# Patient Record
Sex: MALE | Race: WHITE | NOT HISPANIC OR LATINO | Employment: FULL TIME | URBAN - METROPOLITAN AREA
[De-identification: names, ages, dates, MRNs, and addresses within clinical notes are randomized per-mention and may not be internally consistent; named-entity substitution may affect disease eponyms.]

---

## 2018-05-29 ENCOUNTER — OFFICE VISIT CONVERTED (OUTPATIENT)
Dept: SURGERY | Facility: CLINIC | Age: 23
End: 2018-05-29
Attending: PHYSICIAN ASSISTANT

## 2019-07-25 ENCOUNTER — HOSPITAL ENCOUNTER (OUTPATIENT)
Dept: URGENT CARE | Facility: CLINIC | Age: 24
Discharge: HOME | End: 2019-07-25
Attending: FAMILY MEDICINE

## 2019-10-21 ENCOUNTER — HOSPITAL ENCOUNTER (OUTPATIENT)
Dept: URGENT CARE | Facility: CLINIC | Age: 24
Discharge: HOME | End: 2019-10-21

## 2020-02-25 ENCOUNTER — HOSPITAL ENCOUNTER (OUTPATIENT)
Dept: URGENT CARE | Facility: CLINIC | Age: 25
Discharge: HOME | End: 2020-02-25
Attending: FAMILY MEDICINE

## 2020-02-27 LAB — BACTERIA SPEC AEROBE CULT: NORMAL

## 2020-03-01 ENCOUNTER — HOSPITAL ENCOUNTER (OUTPATIENT)
Dept: URGENT CARE | Facility: CLINIC | Age: 25
Discharge: HOME | End: 2020-03-01
Attending: FAMILY MEDICINE

## 2021-05-16 VITALS — WEIGHT: 191.37 LBS | RESPIRATION RATE: 12 BRPM | BODY MASS INDEX: 25.92 KG/M2 | HEIGHT: 72 IN

## 2021-07-15 ENCOUNTER — HOSPITAL ENCOUNTER (EMERGENCY)
Facility: HOSPITAL | Age: 26
Discharge: HOME | End: 2021-07-15
Attending: EMERGENCY MEDICINE | Admitting: EMERGENCY MEDICINE

## 2021-07-15 ENCOUNTER — APPOINTMENT (OUTPATIENT)
Dept: CT IMAGING | Facility: HOSPITAL | Age: 26
End: 2021-07-15

## 2021-07-15 ENCOUNTER — APPOINTMENT (OUTPATIENT)
Dept: GENERAL RADIOLOGY | Facility: HOSPITAL | Age: 26
End: 2021-07-15

## 2021-07-15 VITALS
RESPIRATION RATE: 18 BRPM | BODY MASS INDEX: 29.65 KG/M2 | SYSTOLIC BLOOD PRESSURE: 122 MMHG | TEMPERATURE: 98.9 F | OXYGEN SATURATION: 95 % | HEIGHT: 72 IN | HEART RATE: 73 BPM | DIASTOLIC BLOOD PRESSURE: 73 MMHG | WEIGHT: 218.92 LBS

## 2021-07-15 DIAGNOSIS — R55 VASOVAGAL SYNCOPE: Primary | ICD-10-CM

## 2021-07-15 LAB
ALBUMIN SERPL-MCNC: 4.6 G/DL (ref 3.5–5.2)
ALBUMIN/GLOB SERPL: 2 G/DL
ALP SERPL-CCNC: 92 U/L (ref 39–117)
ALT SERPL W P-5'-P-CCNC: 34 U/L (ref 1–41)
ANION GAP SERPL CALCULATED.3IONS-SCNC: 11.6 MMOL/L (ref 5–15)
AST SERPL-CCNC: 25 U/L (ref 1–40)
BASOPHILS # BLD AUTO: 0.08 10*3/MM3 (ref 0–0.2)
BASOPHILS NFR BLD AUTO: 0.9 % (ref 0–1.5)
BILIRUB SERPL-MCNC: 0.5 MG/DL (ref 0–1.2)
BUN SERPL-MCNC: 11 MG/DL (ref 6–20)
BUN/CREAT SERPL: 11.7 (ref 7–25)
CALCIUM SPEC-SCNC: 9.2 MG/DL (ref 8.6–10.5)
CHLORIDE SERPL-SCNC: 103 MMOL/L (ref 98–107)
CO2 SERPL-SCNC: 25.4 MMOL/L (ref 22–29)
CREAT SERPL-MCNC: 0.94 MG/DL (ref 0.76–1.27)
DEPRECATED RDW RBC AUTO: 41.4 FL (ref 37–54)
EOSINOPHIL # BLD AUTO: 0.12 10*3/MM3 (ref 0–0.4)
EOSINOPHIL NFR BLD AUTO: 1.4 % (ref 0.3–6.2)
ERYTHROCYTE [DISTWIDTH] IN BLOOD BY AUTOMATED COUNT: 12.6 % (ref 12.3–15.4)
GFR SERPL CREATININE-BSD FRML MDRD: 96 ML/MIN/1.73
GLOBULIN UR ELPH-MCNC: 2.3 GM/DL
GLUCOSE SERPL-MCNC: 108 MG/DL (ref 65–99)
HCT VFR BLD AUTO: 41 % (ref 37.5–51)
HGB BLD-MCNC: 14.1 G/DL (ref 13–17.7)
HOLD SPECIMEN: NORMAL
HOLD SPECIMEN: NORMAL
IMM GRANULOCYTES # BLD AUTO: 0.01 10*3/MM3 (ref 0–0.05)
IMM GRANULOCYTES NFR BLD AUTO: 0.1 % (ref 0–0.5)
LYMPHOCYTES # BLD AUTO: 2.92 10*3/MM3 (ref 0.7–3.1)
LYMPHOCYTES NFR BLD AUTO: 33.8 % (ref 19.6–45.3)
MCH RBC QN AUTO: 30.9 PG (ref 26.6–33)
MCHC RBC AUTO-ENTMCNC: 34.4 G/DL (ref 31.5–35.7)
MCV RBC AUTO: 89.9 FL (ref 79–97)
MONOCYTES # BLD AUTO: 0.54 10*3/MM3 (ref 0.1–0.9)
MONOCYTES NFR BLD AUTO: 6.3 % (ref 5–12)
NEUTROPHILS NFR BLD AUTO: 4.96 10*3/MM3 (ref 1.7–7)
NEUTROPHILS NFR BLD AUTO: 57.5 % (ref 42.7–76)
NRBC BLD AUTO-RTO: 0 /100 WBC (ref 0–0.2)
PLATELET # BLD AUTO: 292 10*3/MM3 (ref 140–450)
PMV BLD AUTO: 9.7 FL (ref 6–12)
POTASSIUM SERPL-SCNC: 3.7 MMOL/L (ref 3.5–5.2)
PROT SERPL-MCNC: 6.9 G/DL (ref 6–8.5)
QT INTERVAL: 382 MS
RBC # BLD AUTO: 4.56 10*6/MM3 (ref 4.14–5.8)
SODIUM SERPL-SCNC: 140 MMOL/L (ref 136–145)
WBC # BLD AUTO: 8.63 10*3/MM3 (ref 3.4–10.8)
WHOLE BLOOD HOLD SPECIMEN: NORMAL

## 2021-07-15 PROCEDURE — 70450 CT HEAD/BRAIN W/O DYE: CPT

## 2021-07-15 PROCEDURE — 85025 COMPLETE CBC W/AUTO DIFF WBC: CPT | Performed by: EMERGENCY MEDICINE

## 2021-07-15 PROCEDURE — 80053 COMPREHEN METABOLIC PANEL: CPT | Performed by: EMERGENCY MEDICINE

## 2021-07-15 PROCEDURE — 93010 ELECTROCARDIOGRAM REPORT: CPT | Performed by: INTERNAL MEDICINE

## 2021-07-15 PROCEDURE — 73070 X-RAY EXAM OF ELBOW: CPT

## 2021-07-15 PROCEDURE — 99284 EMERGENCY DEPT VISIT MOD MDM: CPT

## 2021-07-15 PROCEDURE — 93005 ELECTROCARDIOGRAM TRACING: CPT | Performed by: EMERGENCY MEDICINE

## 2021-07-15 RX ORDER — SODIUM CHLORIDE 0.9 % (FLUSH) 0.9 %
10 SYRINGE (ML) INJECTION AS NEEDED
Status: DISCONTINUED | OUTPATIENT
Start: 2021-07-15 | End: 2021-07-15 | Stop reason: HOSPADM

## 2021-07-15 RX ADMIN — SODIUM CHLORIDE 1000 ML: 9 INJECTION, SOLUTION INTRAVENOUS at 14:03

## 2021-07-15 NOTE — DISCHARGE INSTRUCTIONS
Drink plenty of fluids.  Eat small frequent meals.  Return to the emergency department if symptoms worsen or persist.  Follow-up with your primary care doctor in 1 to 2 days.

## 2021-07-15 NOTE — ED PROVIDER NOTES
"Subjective   Michael Acharya is a 26 y.o. male who presents to the emergency department today for evaluation s/p syncopal episode. Pt states he was at work when he received a call from his wife, who is 34 weeks pregnant, saying she might have to deliver today. Pt states with that news and all the stress of work he simply fainted \"due to all the emotion\" . Pt has no other complaints at this time. He denies headache, nausea, emesis, visual changes, confusion, numbness, back pain, neck pain, or any other pertinent sx or concerns.           Review of Systems   Constitutional: Negative for chills and fever.   HENT: Negative for nosebleeds.    Eyes: Negative for redness.   Respiratory: Negative for cough and shortness of breath.    Cardiovascular: Negative for chest pain.   Gastrointestinal: Negative for diarrhea and vomiting.   Genitourinary: Negative for dysuria and frequency.   Musculoskeletal: Negative for back pain and neck pain.   Skin: Negative for rash.   Neurological: Positive for syncope. Negative for seizures.       History reviewed. No pertinent past medical history.    No Known Allergies    History reviewed. No pertinent surgical history.    History reviewed. No pertinent family history.    Social History     Socioeconomic History   • Marital status:      Spouse name: Not on file   • Number of children: Not on file   • Years of education: Not on file   • Highest education level: Not on file   Tobacco Use   • Smoking status: Never Smoker   • Smokeless tobacco: Never Used   Vaping Use   • Vaping Use: Never used   Substance and Sexual Activity   • Alcohol use: Yes     Comment: occ   • Drug use: Never         Objective   Physical Exam  Vitals and nursing note reviewed.   Constitutional:       General: He is not in acute distress.  HENT:      Head: Normocephalic and atraumatic.      Nose: Nose normal.      Mouth/Throat:      Mouth: Mucous membranes are moist.   Eyes:      General: No scleral " icterus.  Cardiovascular:      Rate and Rhythm: Normal rate and regular rhythm.      Heart sounds: Normal heart sounds. No murmur heard.     Pulmonary:      Effort: No respiratory distress.      Breath sounds: Normal breath sounds.   Abdominal:      Palpations: Abdomen is soft.      Tenderness: There is no abdominal tenderness.      Hernia: No hernia is present.   Musculoskeletal:         General: No tenderness. Normal range of motion.      Cervical back: Normal range of motion and neck supple. No tenderness.      Right lower leg: No edema.      Left lower leg: No edema.      Comments: Mild right diffuse elbow tenderness    Skin:     General: Skin is warm and dry.   Neurological:      Mental Status: He is alert. Mental status is at baseline.      Sensory: No sensory deficit.      Motor: No weakness.   Psychiatric:         Behavior: Behavior normal.         Procedures         ED Course    EKG interpretation:  Normal sinus rhythm with a rate of 71  Normal P wave and normal IN interval  Normal QRS interval and normal axis  Normal ST segments  Normal QT and QTc         Labs Reviewed   COMPREHENSIVE METABOLIC PANEL - Abnormal; Notable for the following components:       Result Value    Glucose 108 (*)     All other components within normal limits    Narrative:     GFR Normal >60  Chronic Kidney Disease <60  Kidney Failure <15     CBC WITH AUTO DIFFERENTIAL - Normal   RAINBOW DRAW    Narrative:     The following orders were created for panel order [place] Draw.  Procedure                               Abnormality         Status                     ---------                               -----------         ------                     Green Top (Gel)[[id number]]                                  Final result               Lavender Top[[id number]]                                     Final result               Gold Top - SST[[id number]]                                   Final result                 Please view results for these  tests on the individual orders.   URINALYSIS W/ MICROSCOPIC IF INDICATED (NO CULTURE)   CBC AND DIFFERENTIAL    Narrative:     The following orders were created for panel order CBC & Differential.  Procedure                               Abnormality         Status                     ---------                               -----------         ------                     CBC Auto Differential[[id number]]        Normal              Final result                 Please view results for these tests on the individual orders.   GREEN TOP   LAVENDER TOP   GOLD TOP - SST                                            MDM  Number of Diagnoses or Management Options  Diagnosis management comments: I had a lengthy discussion with the patient about his symptoms and what happened he states that his wife is.  She delivered a baby and has been very anxious about that at work he states that he got back to work after her urgent visit to the OB/GYN had a syncopal episode.  Patient states he feels fine now other than some mild pain to the right elbow.  The patient CT and right elbow x-rays are both negative and the remainder of his work-up is negative.  I suspect that this was a vasovagal episode the patient be discharged home he is to drink plenty of fluids eat small frequent meals return for worsening symptoms or if they return otherwise he is to follow-up with his primary care doctor.       Amount and/or Complexity of Data Reviewed  Clinical lab tests: reviewed  Tests in the radiology section of CPT®: reviewed  Tests in the medicine section of CPT®: reviewed    Patient Progress  Patient progress: improved      Final diagnoses:   Vasovagal syncope       Documentation assistance provided by patricia Dooley.  Information recorded by the patricia was done at my direction and has been verified and validated by me.     Hope Dooley  07/15/21 0183       Brenton Hunter,   07/15/21 2723

## 2021-07-15 NOTE — ED NOTES
Per coworker patient was talking in the break room took a couple steps back then passed out.     Jodie Bonilla RN  07/15/21 8394

## 2021-07-15 NOTE — ED NOTES
Patient reports he has been stressed and he thinks that's why his anxiety went up.     Jodie Bonilla RN  07/15/21 6628

## 2022-01-11 PROCEDURE — U0004 COV-19 TEST NON-CDC HGH THRU: HCPCS | Performed by: NURSE PRACTITIONER

## 2022-01-12 ENCOUNTER — TELEPHONE (OUTPATIENT)
Dept: URGENT CARE | Facility: CLINIC | Age: 27
End: 2022-01-12

## 2022-01-12 NOTE — TELEPHONE ENCOUNTER
----- Message from CARLEY Hallman sent at 1/12/2022  9:34 AM EST -----  Please notify patient of negative COVID result.

## 2022-06-20 PROCEDURE — U0004 COV-19 TEST NON-CDC HGH THRU: HCPCS | Performed by: FAMILY MEDICINE

## 2022-06-22 ENCOUNTER — TELEPHONE (OUTPATIENT)
Dept: URGENT CARE | Facility: CLINIC | Age: 27
End: 2022-06-22

## 2022-06-22 NOTE — TELEPHONE ENCOUNTER
Spoke with patient regarding positive covid result.  Discussed quarantine guidelines and use of mychart for verification of result if needed for employer. No further questions at this time.

## 2023-10-25 ENCOUNTER — APPOINTMENT (OUTPATIENT)
Dept: GENERAL RADIOLOGY | Facility: HOSPITAL | Age: 28
End: 2023-10-25
Payer: MEDICAID

## 2023-10-25 ENCOUNTER — HOSPITAL ENCOUNTER (EMERGENCY)
Facility: HOSPITAL | Age: 28
Discharge: HOME | End: 2023-10-26
Attending: EMERGENCY MEDICINE
Payer: MEDICAID

## 2023-10-25 VITALS
WEIGHT: 211.2 LBS | OXYGEN SATURATION: 99 % | RESPIRATION RATE: 17 BRPM | HEIGHT: 72 IN | SYSTOLIC BLOOD PRESSURE: 129 MMHG | BODY MASS INDEX: 28.61 KG/M2 | TEMPERATURE: 98 F | DIASTOLIC BLOOD PRESSURE: 91 MMHG | HEART RATE: 75 BPM

## 2023-10-25 DIAGNOSIS — F41.9 ANXIETY: ICD-10-CM

## 2023-10-25 DIAGNOSIS — R07.9 CHEST PAIN, UNSPECIFIED TYPE: Primary | ICD-10-CM

## 2023-10-25 LAB
ALBUMIN SERPL-MCNC: 4.5 G/DL (ref 3.5–5.2)
ALBUMIN/GLOB SERPL: 1.7 G/DL
ALP SERPL-CCNC: 86 U/L (ref 39–117)
ALT SERPL W P-5'-P-CCNC: 19 U/L (ref 1–41)
ANION GAP SERPL CALCULATED.3IONS-SCNC: 11.9 MMOL/L (ref 5–15)
AST SERPL-CCNC: 17 U/L (ref 1–40)
BASOPHILS # BLD AUTO: 0.05 10*3/MM3 (ref 0–0.2)
BASOPHILS NFR BLD AUTO: 0.5 % (ref 0–1.5)
BILIRUB SERPL-MCNC: 0.4 MG/DL (ref 0–1.2)
BUN SERPL-MCNC: 16 MG/DL (ref 6–20)
BUN/CREAT SERPL: 17.8 (ref 7–25)
CALCIUM SPEC-SCNC: 9.1 MG/DL (ref 8.6–10.5)
CHLORIDE SERPL-SCNC: 103 MMOL/L (ref 98–107)
CO2 SERPL-SCNC: 24.1 MMOL/L (ref 22–29)
CREAT SERPL-MCNC: 0.9 MG/DL (ref 0.76–1.27)
DEPRECATED RDW RBC AUTO: 39.5 FL (ref 37–54)
EGFRCR SERPLBLD CKD-EPI 2021: 118.6 ML/MIN/1.73
EOSINOPHIL # BLD AUTO: 0.07 10*3/MM3 (ref 0–0.4)
EOSINOPHIL NFR BLD AUTO: 0.7 % (ref 0.3–6.2)
ERYTHROCYTE [DISTWIDTH] IN BLOOD BY AUTOMATED COUNT: 12 % (ref 12.3–15.4)
GLOBULIN UR ELPH-MCNC: 2.7 GM/DL
GLUCOSE SERPL-MCNC: 93 MG/DL (ref 65–99)
HCT VFR BLD AUTO: 41.8 % (ref 37.5–51)
HGB BLD-MCNC: 14 G/DL (ref 13–17.7)
HOLD SPECIMEN: NORMAL
HOLD SPECIMEN: NORMAL
IMM GRANULOCYTES # BLD AUTO: 0.02 10*3/MM3 (ref 0–0.05)
IMM GRANULOCYTES NFR BLD AUTO: 0.2 % (ref 0–0.5)
LIPASE SERPL-CCNC: 23 U/L (ref 13–60)
LYMPHOCYTES # BLD AUTO: 2.81 10*3/MM3 (ref 0.7–3.1)
LYMPHOCYTES NFR BLD AUTO: 29.7 % (ref 19.6–45.3)
MAGNESIUM SERPL-MCNC: 2.1 MG/DL (ref 1.6–2.6)
MCH RBC QN AUTO: 30.2 PG (ref 26.6–33)
MCHC RBC AUTO-ENTMCNC: 33.5 G/DL (ref 31.5–35.7)
MCV RBC AUTO: 90.1 FL (ref 79–97)
MONOCYTES # BLD AUTO: 0.62 10*3/MM3 (ref 0.1–0.9)
MONOCYTES NFR BLD AUTO: 6.6 % (ref 5–12)
NEUTROPHILS NFR BLD AUTO: 5.88 10*3/MM3 (ref 1.7–7)
NEUTROPHILS NFR BLD AUTO: 62.3 % (ref 42.7–76)
NRBC BLD AUTO-RTO: 0 /100 WBC (ref 0–0.2)
NT-PROBNP SERPL-MCNC: <36 PG/ML (ref 0–450)
PLATELET # BLD AUTO: 323 10*3/MM3 (ref 140–450)
PMV BLD AUTO: 10 FL (ref 6–12)
POTASSIUM SERPL-SCNC: 3.8 MMOL/L (ref 3.5–5.2)
PROT SERPL-MCNC: 7.2 G/DL (ref 6–8.5)
RBC # BLD AUTO: 4.64 10*6/MM3 (ref 4.14–5.8)
SODIUM SERPL-SCNC: 139 MMOL/L (ref 136–145)
TROPONIN T SERPL HS-MCNC: <6 NG/L
WBC NRBC COR # BLD: 9.45 10*3/MM3 (ref 3.4–10.8)
WHOLE BLOOD HOLD COAG: NORMAL
WHOLE BLOOD HOLD SPECIMEN: NORMAL

## 2023-10-25 PROCEDURE — 84484 ASSAY OF TROPONIN QUANT: CPT | Performed by: EMERGENCY MEDICINE

## 2023-10-25 PROCEDURE — 83880 ASSAY OF NATRIURETIC PEPTIDE: CPT | Performed by: EMERGENCY MEDICINE

## 2023-10-25 PROCEDURE — 71045 X-RAY EXAM CHEST 1 VIEW: CPT

## 2023-10-25 PROCEDURE — 85025 COMPLETE CBC W/AUTO DIFF WBC: CPT

## 2023-10-25 PROCEDURE — 99284 EMERGENCY DEPT VISIT MOD MDM: CPT

## 2023-10-25 PROCEDURE — 83690 ASSAY OF LIPASE: CPT | Performed by: EMERGENCY MEDICINE

## 2023-10-25 PROCEDURE — 80053 COMPREHEN METABOLIC PANEL: CPT | Performed by: EMERGENCY MEDICINE

## 2023-10-25 PROCEDURE — 93010 ELECTROCARDIOGRAM REPORT: CPT | Performed by: INTERNAL MEDICINE

## 2023-10-25 PROCEDURE — 36415 COLL VENOUS BLD VENIPUNCTURE: CPT

## 2023-10-25 PROCEDURE — 93005 ELECTROCARDIOGRAM TRACING: CPT

## 2023-10-25 PROCEDURE — 83735 ASSAY OF MAGNESIUM: CPT | Performed by: EMERGENCY MEDICINE

## 2023-10-25 PROCEDURE — 93005 ELECTROCARDIOGRAM TRACING: CPT | Performed by: EMERGENCY MEDICINE

## 2023-10-25 RX ORDER — ASPIRIN 81 MG/1
324 TABLET, CHEWABLE ORAL ONCE
Status: DISCONTINUED | OUTPATIENT
Start: 2023-10-25 | End: 2023-10-26 | Stop reason: HOSPADM

## 2023-10-25 RX ORDER — SODIUM CHLORIDE 0.9 % (FLUSH) 0.9 %
10 SYRINGE (ML) INJECTION AS NEEDED
Status: DISCONTINUED | OUTPATIENT
Start: 2023-10-25 | End: 2023-10-26 | Stop reason: HOSPADM

## 2023-10-25 RX ORDER — LORAZEPAM 0.5 MG/1
1 TABLET ORAL ONCE
Status: COMPLETED | OUTPATIENT
Start: 2023-10-25 | End: 2023-10-25

## 2023-10-25 RX ADMIN — LORAZEPAM 1 MG: 0.5 TABLET ORAL at 23:29

## 2023-10-26 LAB
QT INTERVAL: 368 MS
QTC INTERVAL: 381 MS

## 2023-10-26 NOTE — ED PROVIDER NOTES
Time: 9:53 PM EDT  Date of encounter:  10/25/2023  Independent Historian/Clinical History and Information was obtained by:   Patient    History is limited by: N/A    Chief Complaint   Patient presents with    Chest Pain         History of Present Illness:  Patient is a 28 y.o. year old male who presents to the emergency department for evaluation of patient complains of chest pain that started 1 hour ago while he was at work.  Also had a near syncopal episode at the start of chest pain. (CARLEY Gaines Provider in Triage)    Some left-sided chest pain and anxiety that developed prior to arrival while at work.  He has no radiation of his pain.  No associated nausea vomiting or shortness of breath.  He does state that he has suffered with increasing anxiety and some depression.  He has had constant thoughts of suicide but no specific plan.  He states he has been dealing with this for some time and follows with a therapist as an outpatient.      Patient Care Team  Primary Care Provider: Provider, No Known    Past Medical History:     No Known Allergies  Past Medical History:   Diagnosis Date    Anxiety      History reviewed. No pertinent surgical history.  History reviewed. No pertinent family history.    Home Medications:  Prior to Admission medications    Medication Sig Start Date End Date Taking? Authorizing Provider   cetirizine (zyrTEC) 10 MG tablet Take 1 tablet by mouth Daily for 30 days. 7/1/23 7/31/23  Leslee Rogers APRN   fluticasone (FLONASE) 50 MCG/ACT nasal spray 2 sprays into the nostril(s) as directed by provider Daily. 7/1/23   Leslee Rogers APRN   trimethoprim-polymyxin b (Polytrim) 18521-9.1 UNIT/ML-% ophthalmic solution Administer 1 drop into the left eye Every 4 (Four) Hours for 7 days. 10/17/23 10/24/23  Yuri Mcnamara PA-C        Social History:   Social History     Tobacco Use    Smoking status: Never    Smokeless tobacco: Never   Vaping Use    Vaping Use: Never used  "  Substance Use Topics    Alcohol use: Yes     Comment: occ    Drug use: Never         Review of Systems:  Review of Systems   Constitutional:  Negative for chills and fever.   HENT:  Negative for congestion, ear pain and sore throat.    Eyes:  Negative for pain.   Respiratory:  Negative for cough, chest tightness and shortness of breath.    Cardiovascular:  Positive for chest pain.   Gastrointestinal:  Negative for abdominal pain, diarrhea, nausea and vomiting.   Genitourinary:  Negative for flank pain and hematuria.   Musculoskeletal:  Negative for joint swelling.   Skin:  Negative for pallor.   Neurological:  Negative for seizures and headaches.   Psychiatric/Behavioral:  Positive for dysphoric mood. The patient is nervous/anxious.    All other systems reviewed and are negative.       Physical Exam:  /91 (BP Location: Left arm, Patient Position: Sitting)   Pulse 75   Temp 98 °F (36.7 °C) (Oral)   Resp 17   Ht 182.9 cm (72\")   Wt 95.8 kg (211 lb 3.2 oz)   SpO2 99%   BMI 28.64 kg/m²         Physical Exam  Vitals and nursing note reviewed.   Constitutional:       General: He is not in acute distress.     Appearance: Normal appearance. He is not toxic-appearing.   HENT:      Head: Normocephalic and atraumatic.      Jaw: There is normal jaw occlusion.   Eyes:      General: Lids are normal.      Extraocular Movements: Extraocular movements intact.      Conjunctiva/sclera: Conjunctivae normal.      Pupils: Pupils are equal, round, and reactive to light.   Cardiovascular:      Rate and Rhythm: Normal rate and regular rhythm.      Pulses: Normal pulses.      Heart sounds: Normal heart sounds.   Pulmonary:      Effort: Pulmonary effort is normal. No respiratory distress.      Breath sounds: Normal breath sounds. No wheezing or rhonchi.   Abdominal:      General: Abdomen is flat. There is no distension.      Palpations: Abdomen is soft.      Tenderness: There is no abdominal tenderness. There is no guarding or " rebound.   Musculoskeletal:         General: Normal range of motion.      Cervical back: Normal range of motion and neck supple.      Right lower leg: No edema.      Left lower leg: No edema.   Skin:     General: Skin is warm and dry.      Coloration: Skin is not cyanotic.   Neurological:      Mental Status: He is alert and oriented to person, place, and time. Mental status is at baseline.   Psychiatric:         Attention and Perception: Attention and perception normal.         Mood and Affect: Mood normal.                  Procedures:  Procedures      Medical Decision Making:      Comorbidities that affect care:    Anxiety    External Notes reviewed:    Previous Clinic Note: Outpatient urgent care visit October 17, 2023      The following orders were placed and all results were independently analyzed by me:  Orders Placed This Encounter   Procedures    XR Chest 1 View    [place] Draw    High Sensitivity Troponin T    Comprehensive Metabolic Panel    Lipase    BNP    Magnesium    CBC Auto Differential    Ambulatory Referral to Psychiatry    Undress & Gown    Continuous Pulse Oximetry    CBC & Differential    Green Top (Gel)    Lavender Top    Gold Top - SST    Light Blue Top       Medications Given in the Emergency Department:  Medications   LORazepam (ATIVAN) tablet 1 mg (1 mg Oral Given 10/25/23 0058)        ED Course:    The patient was initially evaluated in the triage area where orders were placed. The patient was later dispositioned by Christo Portillo MD.      The patient was advised to stay for completion of workup which includes but is not limited to communication of labs and radiological results, reassessment and plan. The patient was advised that leaving prior to disposition by a provider could result in critical findings that are not communicated to the patient.     ED Course as of 10/26/23 0727   Wed Oct 25, 2023   7134   --- PROVIDER IN TRIAGE NOTE ---    The patient was evaluated by Jamila montero in  triage. Orders were placed and the patient is currently awaiting disposition.      [CE]   2300 My interpretation of EKG: Sinus rhythm 64, no acute ischemia [JS]   u Oct 26, 2023   0006 HEART SCORE  History: Slightly Suspicious (0)  ECG: Normal (0)  Age: Less than 45 years (0)  Risk factors: No Risk Factors (0)  Troponin: normal (0)    This patient's HEART score is 0.    According to the HEART Score Study: Score (Risk of adverse cardiac event in the next 14 days)  Scores 0-3: (0.9-1.7%) In the HEART Score study, these patients were discharged home.  Scores 4-6: (12-16.6%)  In the HEART Score study, these patients were admitted to the hospital.  Scores =7: (50-65%) In the HEART Score study, these patients were candidates for early invasive measures.   Final disposition is based on individual provider judgement and current clinical situation.     [JS]   0006 Patient does endorse some suicidal thoughts that he states have been ongoing and chronic in nature.  He has no specific plan.  He is currently following with a therapist who he sees every other week.  He is not currently on any medications for his anxiety and depression.  He is interested in following up with a psychiatrist.  I have provided him with an ambulatory referral.  He states he is not a danger to himself.  He agrees to promptly return to the emergency department with any worsening of his symptoms.  We discussed return precautions including worsening symptoms or any additional concerns. [JS]      ED Course User Index  [CE] Jamila Chiu, APRN  [JS] Christo Portillo MD       Labs:    Lab Results (last 24 hours)       Procedure Component Value Units Date/Time    High Sensitivity Troponin T [[id number]]  (Normal) Collected: 10/25/23 2205    Specimen: Blood Updated: 10/25/23 2307     HS Troponin T <6 ng/L     Narrative:      High Sensitive Troponin T Reference Range:  <10.0 ng/L- Negative Female for AMI  <15.0 ng/L- Negative Male for AMI  >=10 - Abnormal  Female indicating possible myocardial injury.  >=15 - Abnormal Male indicating possible myocardial injury.   Clinicians would have to utilize clinical acumen, EKG, Troponin, and serial changes to determine if it is an Acute Myocardial Infarction or myocardial injury due to an underlying chronic condition.         CBC & Differential [[id number]]  (Abnormal) Collected: 10/25/23 2205    Specimen: Blood Updated: 10/25/23 2235    Narrative:      The following orders were created for panel order CBC & Differential.  Procedure                               Abnormality         Status                     ---------                               -----------         ------                     CBC Auto Differential[[id number]]        Abnormal            Final result                 Please view results for these tests on the individual orders.    Comprehensive Metabolic Panel [[id number]] Collected: 10/25/23 2205    Specimen: Blood Updated: 10/25/23 2309     Glucose 93 mg/dL      BUN 16 mg/dL      Creatinine 0.90 mg/dL      Sodium 139 mmol/L      Potassium 3.8 mmol/L      Chloride 103 mmol/L      CO2 24.1 mmol/L      Calcium 9.1 mg/dL      Total Protein 7.2 g/dL      Albumin 4.5 g/dL      ALT (SGPT) 19 U/L      AST (SGOT) 17 U/L      Alkaline Phosphatase 86 U/L      Total Bilirubin 0.4 mg/dL      Globulin 2.7 gm/dL      A/G Ratio 1.7 g/dL      BUN/Creatinine Ratio 17.8     Anion Gap 11.9 mmol/L      eGFR 118.6 mL/min/1.73     Narrative:      GFR Normal >60  Chronic Kidney Disease <60  Kidney Failure <15      Lipase [[id number]]  (Normal) Collected: 10/25/23 2205    Specimen: Blood Updated: 10/25/23 2309     Lipase 23 U/L     BNP [[id number]]  (Normal) Collected: 10/25/23 2205    Specimen: Blood Updated: 10/25/23 2303     proBNP <36.0 pg/mL     Narrative:      This assay is used as an aid in the diagnosis of individuals suspected of having heart failure. It can be used as an aid in the diagnosis of acute decompensated heart failure  (ADHF) in patients presenting with signs and symptoms of ADHF to the emergency department (ED). In addition, NT-proBNP of <300 pg/mL indicates ADHF is not likely.    Age Range Result Interpretation  NT-proBNP Concentration (pg/mL:      <50             Positive            >450                   Gray                 300-450                    Negative             <300    50-75           Positive            >900                  Gray                300-900                  Negative            <300      >75             Positive            >1800                  Gray                300-1800                  Negative            <300    Magnesium [[id number]]  (Normal) Collected: 10/25/23 2205    Specimen: Blood Updated: 10/25/23 2309     Magnesium 2.1 mg/dL     CBC Auto Differential [[id number]]  (Abnormal) Collected: 10/25/23 2205    Specimen: Blood Updated: 10/25/23 2235     WBC 9.45 10*3/mm3      RBC 4.64 10*6/mm3      Hemoglobin 14.0 g/dL      Hematocrit 41.8 %      MCV 90.1 fL      MCH 30.2 pg      MCHC 33.5 g/dL      RDW 12.0 %      RDW-SD 39.5 fl      MPV 10.0 fL      Platelets 323 10*3/mm3      Neutrophil % 62.3 %      Lymphocyte % 29.7 %      Monocyte % 6.6 %      Eosinophil % 0.7 %      Basophil % 0.5 %      Immature Grans % 0.2 %      Neutrophils, Absolute 5.88 10*3/mm3      Lymphocytes, Absolute 2.81 10*3/mm3      Monocytes, Absolute 0.62 10*3/mm3      Eosinophils, Absolute 0.07 10*3/mm3      Basophils, Absolute 0.05 10*3/mm3      Immature Grans, Absolute 0.02 10*3/mm3      nRBC 0.0 /100 WBC              Imaging:    XR Chest 1 View    Result Date: 10/26/2023  PROCEDURE: XR CHEST 1 VW  COMPARISON: 10/24/2015.  INDICATIONS: Chest pain.  FINDINGS:  A single AP (or PA) upright portable chest radiograph was performed.  No cardiac enlargement is seen.  No acute infiltrate is appreciated.  No pleural effusion or pneumothorax is identified.  No significant interval change is seen since the prior study (or studies).         No acute infiltrate is appreciated.     Please note that portions of this note were completed with a voice recognition program.  HERMINIO CEBALLOS JR, MD       Electronically Signed and Approved By: HERMINIO CEBALLOS JR, MD on 10/26/2023 at 0:36                 Differential Diagnosis and Discussion:      Chest Pain:  Based on the patient's signs and symptoms, I considered aortic dissection, myocardial infaction, pulmonary embolism, cardiac tamponade, pericarditis, pneumothorax, musculoskeletal chest pain and other differential diagnosis as an etiology of the patient's chest pain.     All labs were reviewed and interpreted by me.  All X-rays impressions were independently interpreted by me.  EKG was interpreted by me.    MDM           Patient Care Considerations:    PERC: I used the PERC score to risk stratify the patient for PE and a CT of the chest was considered but ultimately not indicated in today's visit.      Consultants/Shared Management Plan:    None    Social Determinants of Health:    Patient is independent, reliable, and has access to care.       Disposition and Care Coordination:    Discharged: The patient is suitable and stable for discharge with no need for consideration of observation or admission.    I have explained the patient´s condition, diagnoses and treatment plan based on the information available to me at this time. I have answered questions and addressed any concerns. The patient has a good  understanding of the patient´s diagnosis, condition, and treatment plan as can be expected at this point. The vital signs have been stable. The patient´s condition is stable and appropriate for discharge from the emergency department.      The patient will pursue further outpatient evaluation with the primary care physician or other designated or consulting physician as outlined in the discharge instructions. They are agreeable to this plan of care and follow-up instructions have been explained in  detail. The patient has received these instructions in written format and have expressed an understanding of the discharge instructions. The patient is aware that any significant change in condition or worsening of symptoms should prompt an immediate return to this or the closest emergency department or call to 911.  I have explained discharge medications and the need for follow up with the patient/caretakers. This was also printed in the discharge instructions. Patient was discharged with the following medications and follow up:      Medication List        Stop      trimethoprim-polymyxin b 44030-7.1 UNIT/ML-% ophthalmic solution  Commonly known as: Polytrim           [facility]  [address] [facility] [address]  [phone]  Schedule an appointment as soon as possible for a visit          Final diagnoses:   Chest pain, unspecified type   Anxiety        ED Disposition       ED Disposition   Discharge    Condition   Stable    Comment   --               This medical record created using voice recognition software.             Christo Portillo MD  10/26/23 0747

## 2023-10-31 PROBLEM — J02.0 STREP PHARYNGITIS: Status: ACTIVE | Noted: 2023-10-31

## 2024-08-22 ENCOUNTER — HOSPITAL ENCOUNTER (EMERGENCY)
Facility: HOSPITAL | Age: 29
Discharge: HOME | End: 2024-08-22
Attending: EMERGENCY MEDICINE
Payer: COMMERCIAL

## 2024-08-22 ENCOUNTER — APPOINTMENT (OUTPATIENT)
Dept: GENERAL RADIOLOGY | Facility: HOSPITAL | Age: 29
End: 2024-08-22
Payer: COMMERCIAL

## 2024-08-22 ENCOUNTER — APPOINTMENT (OUTPATIENT)
Dept: CT IMAGING | Facility: HOSPITAL | Age: 29
End: 2024-08-22
Payer: COMMERCIAL

## 2024-08-22 VITALS
OXYGEN SATURATION: 96 % | SYSTOLIC BLOOD PRESSURE: 113 MMHG | WEIGHT: 216 LBS | BODY MASS INDEX: 29.26 KG/M2 | DIASTOLIC BLOOD PRESSURE: 79 MMHG | TEMPERATURE: 97.9 F | HEART RATE: 72 BPM | HEIGHT: 72 IN | RESPIRATION RATE: 13 BRPM

## 2024-08-22 DIAGNOSIS — R09.1 PLEURISY: Primary | ICD-10-CM

## 2024-08-22 LAB
ALBUMIN SERPL-MCNC: 4.5 G/DL (ref 3.5–5.2)
ALBUMIN/GLOB SERPL: 1.5 G/DL
ALP SERPL-CCNC: 92 U/L (ref 39–117)
ALT SERPL W P-5'-P-CCNC: 22 U/L (ref 1–41)
ANION GAP SERPL CALCULATED.3IONS-SCNC: 9.5 MMOL/L (ref 5–15)
AST SERPL-CCNC: 21 U/L (ref 1–40)
BASOPHILS # BLD AUTO: 0.06 10*3/MM3 (ref 0–0.2)
BASOPHILS NFR BLD AUTO: 0.6 % (ref 0–1.5)
BILIRUB SERPL-MCNC: 0.6 MG/DL (ref 0–1.2)
BUN SERPL-MCNC: 9 MG/DL (ref 6–20)
BUN/CREAT SERPL: 8.2 (ref 7–25)
CALCIUM SPEC-SCNC: 9.6 MG/DL (ref 8.6–10.5)
CHLORIDE SERPL-SCNC: 101 MMOL/L (ref 98–107)
CO2 SERPL-SCNC: 27.5 MMOL/L (ref 22–29)
CREAT SERPL-MCNC: 1.1 MG/DL (ref 0.76–1.27)
DEPRECATED RDW RBC AUTO: 40.3 FL (ref 37–54)
EGFRCR SERPLBLD CKD-EPI 2021: 92.6 ML/MIN/1.73
EOSINOPHIL # BLD AUTO: 0.07 10*3/MM3 (ref 0–0.4)
EOSINOPHIL NFR BLD AUTO: 0.7 % (ref 0.3–6.2)
ERYTHROCYTE [DISTWIDTH] IN BLOOD BY AUTOMATED COUNT: 12.3 % (ref 12.3–15.4)
GEN 5 2HR TROPONIN T REFLEX: <6 NG/L
GLOBULIN UR ELPH-MCNC: 3 GM/DL
GLUCOSE SERPL-MCNC: 120 MG/DL (ref 65–99)
HCT VFR BLD AUTO: 43.1 % (ref 37.5–51)
HGB BLD-MCNC: 14.6 G/DL (ref 13–17.7)
HOLD SPECIMEN: NORMAL
HOLD SPECIMEN: NORMAL
IMM GRANULOCYTES # BLD AUTO: 0.03 10*3/MM3 (ref 0–0.05)
IMM GRANULOCYTES NFR BLD AUTO: 0.3 % (ref 0–0.5)
LIPASE SERPL-CCNC: 18 U/L (ref 13–60)
LYMPHOCYTES # BLD AUTO: 1.2 10*3/MM3 (ref 0.7–3.1)
LYMPHOCYTES NFR BLD AUTO: 12.3 % (ref 19.6–45.3)
MAGNESIUM SERPL-MCNC: 1.9 MG/DL (ref 1.6–2.6)
MCH RBC QN AUTO: 30.4 PG (ref 26.6–33)
MCHC RBC AUTO-ENTMCNC: 33.9 G/DL (ref 31.5–35.7)
MCV RBC AUTO: 89.8 FL (ref 79–97)
MONOCYTES # BLD AUTO: 0.65 10*3/MM3 (ref 0.1–0.9)
MONOCYTES NFR BLD AUTO: 6.7 % (ref 5–12)
NEUTROPHILS NFR BLD AUTO: 7.72 10*3/MM3 (ref 1.7–7)
NEUTROPHILS NFR BLD AUTO: 79.4 % (ref 42.7–76)
NRBC BLD AUTO-RTO: 0 /100 WBC (ref 0–0.2)
NT-PROBNP SERPL-MCNC: <36 PG/ML (ref 0–450)
PLATELET # BLD AUTO: 305 10*3/MM3 (ref 140–450)
PMV BLD AUTO: 9.5 FL (ref 6–12)
POTASSIUM SERPL-SCNC: 4.1 MMOL/L (ref 3.5–5.2)
PROT SERPL-MCNC: 7.5 G/DL (ref 6–8.5)
QT INTERVAL: 346 MS
QT INTERVAL: 373 MS
QTC INTERVAL: 401 MS
QTC INTERVAL: 411 MS
RBC # BLD AUTO: 4.8 10*6/MM3 (ref 4.14–5.8)
SODIUM SERPL-SCNC: 138 MMOL/L (ref 136–145)
TROPONIN T DELTA: NORMAL
TROPONIN T SERPL HS-MCNC: <6 NG/L
WBC NRBC COR # BLD AUTO: 9.73 10*3/MM3 (ref 3.4–10.8)
WHOLE BLOOD HOLD COAG: NORMAL
WHOLE BLOOD HOLD SPECIMEN: NORMAL

## 2024-08-22 PROCEDURE — 93005 ELECTROCARDIOGRAM TRACING: CPT

## 2024-08-22 PROCEDURE — 93010 ELECTROCARDIOGRAM REPORT: CPT | Performed by: INTERNAL MEDICINE

## 2024-08-22 PROCEDURE — 85025 COMPLETE CBC W/AUTO DIFF WBC: CPT | Performed by: EMERGENCY MEDICINE

## 2024-08-22 PROCEDURE — 71045 X-RAY EXAM CHEST 1 VIEW: CPT

## 2024-08-22 PROCEDURE — 93005 ELECTROCARDIOGRAM TRACING: CPT | Performed by: EMERGENCY MEDICINE

## 2024-08-22 PROCEDURE — 96375 TX/PRO/DX INJ NEW DRUG ADDON: CPT

## 2024-08-22 PROCEDURE — 83880 ASSAY OF NATRIURETIC PEPTIDE: CPT | Performed by: EMERGENCY MEDICINE

## 2024-08-22 PROCEDURE — 25510000001 IOPAMIDOL PER 1 ML: Performed by: EMERGENCY MEDICINE

## 2024-08-22 PROCEDURE — 25010000002 METHYLPREDNISOLONE PER 125 MG: Performed by: EMERGENCY MEDICINE

## 2024-08-22 PROCEDURE — 71260 CT THORAX DX C+: CPT

## 2024-08-22 PROCEDURE — 84484 ASSAY OF TROPONIN QUANT: CPT | Performed by: EMERGENCY MEDICINE

## 2024-08-22 PROCEDURE — 83690 ASSAY OF LIPASE: CPT | Performed by: EMERGENCY MEDICINE

## 2024-08-22 PROCEDURE — 36415 COLL VENOUS BLD VENIPUNCTURE: CPT

## 2024-08-22 PROCEDURE — 80053 COMPREHEN METABOLIC PANEL: CPT | Performed by: EMERGENCY MEDICINE

## 2024-08-22 PROCEDURE — 99285 EMERGENCY DEPT VISIT HI MDM: CPT

## 2024-08-22 PROCEDURE — 96374 THER/PROPH/DIAG INJ IV PUSH: CPT

## 2024-08-22 PROCEDURE — 83735 ASSAY OF MAGNESIUM: CPT | Performed by: EMERGENCY MEDICINE

## 2024-08-22 PROCEDURE — 25010000002 KETOROLAC TROMETHAMINE PER 15 MG: Performed by: EMERGENCY MEDICINE

## 2024-08-22 RX ORDER — FAMOTIDINE 20 MG/1
20 TABLET, FILM COATED ORAL 2 TIMES DAILY
Qty: 30 TABLET | Refills: 0 | Status: SHIPPED | OUTPATIENT
Start: 2024-08-22

## 2024-08-22 RX ORDER — METHYLPREDNISOLONE 4 MG
TABLET, DOSE PACK ORAL
Qty: 21 TABLET | Refills: 0 | Status: SHIPPED | OUTPATIENT
Start: 2024-08-22 | End: 2024-08-27

## 2024-08-22 RX ORDER — IOPAMIDOL 755 MG/ML
100 INJECTION, SOLUTION INTRAVASCULAR
Status: COMPLETED | OUTPATIENT
Start: 2024-08-22 | End: 2024-08-22

## 2024-08-22 RX ORDER — ASPIRIN 81 MG/1
324 TABLET, CHEWABLE ORAL ONCE
Status: COMPLETED | OUTPATIENT
Start: 2024-08-22 | End: 2024-08-22

## 2024-08-22 RX ORDER — SODIUM CHLORIDE 0.9 % (FLUSH) 0.9 %
10 SYRINGE (ML) INJECTION AS NEEDED
Status: DISCONTINUED | OUTPATIENT
Start: 2024-08-22 | End: 2024-08-23 | Stop reason: HOSPADM

## 2024-08-22 RX ORDER — KETOROLAC TROMETHAMINE 30 MG/ML
30 INJECTION, SOLUTION INTRAMUSCULAR; INTRAVENOUS ONCE
Status: COMPLETED | OUTPATIENT
Start: 2024-08-22 | End: 2024-08-22

## 2024-08-22 RX ORDER — IBUPROFEN 800 MG/1
800 TABLET, FILM COATED ORAL EVERY 8 HOURS PRN
Qty: 30 TABLET | Refills: 0 | Status: SHIPPED | OUTPATIENT
Start: 2024-08-22

## 2024-08-22 RX ADMIN — KETOROLAC TROMETHAMINE 30 MG: 30 INJECTION, SOLUTION INTRAMUSCULAR; INTRAVENOUS at 20:17

## 2024-08-22 RX ADMIN — IOPAMIDOL 100 ML: 755 INJECTION, SOLUTION INTRAVENOUS at 21:31

## 2024-08-22 RX ADMIN — ASPIRIN 324 MG: 81 TABLET, CHEWABLE ORAL at 18:29

## 2024-08-22 RX ADMIN — METHYLPREDNISOLONE SODIUM SUCCINATE 125 MG: 125 INJECTION INTRAMUSCULAR; INTRAVENOUS at 20:18

## 2024-08-22 NOTE — ED NOTES
Pt has some concerns with having chest pain as he works with hazardous material daily and is worried it could have something to do with exposure

## 2024-08-22 NOTE — Clinical Note
[facility] EMERGENCY ROOM  [address] [place] [address]  Phone: [phone]  Fax: [phone]    Michael Acharya was seen and treated in our emergency department on 8/22/2024.  He may return to work on 08/24/2024.         Thank you for choosing [facility].    Abiodun Jarquin MD

## 2024-08-23 NOTE — ED PROVIDER NOTES
Time: 9:55 PM EDT  Date of encounter:  8/22/2024  Independent Historian/Clinical History and Information was obtained by:   Patient    History is limited by: N/A    Chief Complaint: Chest pain      History of Present Illness:  Patient is a 29 y.o. year old male who presents to the emergency department for evaluation of chest pain with inspiration.  Patient reports no pain unless he takes a deep breath.  He does report the pain is improved after taking aspirin.  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    HPI    Patient Care Team  Primary Care Provider: Provider, No Known    Past Medical History:     No Known Allergies  Past Medical History:   Diagnosis Date    Anxiety      History reviewed. No pertinent surgical history.  History reviewed. No pertinent family history.    Home Medications:  Prior to Admission medications    Medication Sig Start Date End Date Taking? Authorizing Provider   azithromycin (Zithromax Z-Manish) 250 MG tablet Take 2 tablets by mouth on day 1, then 1 tablet daily on days 2-5 5/20/24   Roque Faye DO   benzonatate (TESSALON) 100 MG capsule Take 2 capsules by mouth 3 (Three) Times a Day As Needed for Cough for up to 30 doses. 2/23/24   Yuri Mcnamara PA-C   famotidine (PEPCID) 20 MG tablet Take 1 tablet by mouth 2 (Two) Times a Day. 8/22/24   Abiodun Jarquin MD   ibuprofen (ADVIL,MOTRIN) 800 MG tablet Take 1 tablet by mouth Every 8 (Eight) Hours As Needed for Mild Pain. 8/22/24   Abiodun Jarquin MD   methylPREDNISolone (MEDROL) 4 MG dose pack Take 6 tablets by mouth Daily for 1 day, THEN 5 tablets Daily for 1 day, THEN 4 tablets Daily for 1 day, THEN 3 tablets Daily for 1 day, THEN 2 tablets Daily for 1 day, THEN 1 tablet Daily for 1 day. Take as directed on package instructions. 8/22/24 8/27/24  Abiodun Jarquin MD        Social History:   Social History     Tobacco Use    Smoking  "status: Never     Passive exposure: Current    Smokeless tobacco: Never   Vaping Use    Vaping status: Never Used   Substance Use Topics    Alcohol use: Yes     Comment: Rare    Drug use: Never         Review of Systems:  Review of Systems   Constitutional:  Negative for chills and fever.   HENT:  Negative for congestion, rhinorrhea and sore throat.    Eyes:  Negative for pain and visual disturbance.   Respiratory:  Positive for chest tightness. Negative for apnea, cough and shortness of breath.    Cardiovascular:  Negative for chest pain and palpitations.   Gastrointestinal:  Negative for abdominal pain, diarrhea, nausea and vomiting.   Genitourinary:  Negative for difficulty urinating and dysuria.   Musculoskeletal:  Negative for joint swelling and myalgias.   Skin:  Negative for color change.   Neurological:  Negative for seizures and headaches.   Psychiatric/Behavioral: Negative.     All other systems reviewed and are negative.       Physical Exam:  /74   Pulse 77   Temp 98.6 °F (37 °C) (Oral)   Resp 11   Ht 182.9 cm (72\")   Wt 98 kg (216 lb)   SpO2 96%   BMI 29.29 kg/m²     Physical Exam  Vitals and nursing note reviewed.   Constitutional:       General: He is not in acute distress.     Appearance: Normal appearance. He is not toxic-appearing.   HENT:      Head: Normocephalic and atraumatic.      Jaw: There is normal jaw occlusion.   Eyes:      General: Lids are normal.      Extraocular Movements: Extraocular movements intact.      Conjunctiva/sclera: Conjunctivae normal.      Pupils: Pupils are equal, round, and reactive to light.   Cardiovascular:      Rate and Rhythm: Normal rate and regular rhythm.      Pulses: Normal pulses.      Heart sounds: Normal heart sounds.   Pulmonary:      Effort: Pulmonary effort is normal. No respiratory distress.      Breath sounds: Normal breath sounds. No wheezing or rhonchi.   Abdominal:      General: Abdomen is flat.      Palpations: Abdomen is soft.      " Tenderness: There is no abdominal tenderness. There is no guarding or rebound.   Musculoskeletal:         General: Normal range of motion.      Cervical back: Normal range of motion and neck supple.      Right lower leg: No edema.      Left lower leg: No edema.   Skin:     General: Skin is warm and dry.   Neurological:      Mental Status: He is alert and oriented to person, place, and time. Mental status is at baseline.   Psychiatric:         Mood and Affect: Mood normal.                  Procedures:  Procedures      Medical Decision Making:      Comorbidities that affect care:    None    External Notes reviewed:    None      The following orders were placed and all results were independently analyzed by me:  Orders Placed This Encounter   Procedures    XR Chest 1 View    CT Chest With Contrast Diagnostic    [place] Draw    High Sensitivity Troponin T    Comprehensive Metabolic Panel    Lipase    BNP    Magnesium    CBC Auto Differential    High Sensitivity Troponin T 2Hr    NPO Diet NPO Type: Strict NPO    Undress & Gown    Continuous Pulse Oximetry    Oxygen Therapy- Nasal Cannula; Titrate 1-6 LPM Per SpO2; 90 - 95%    ECG 12 Lead ED Triage Standing Order; Chest Pain    ECG 12 Lead ED Triage Standing Order; Chest Pain    Insert Peripheral IV    CBC & Differential    Green Top (Gel)    Lavender Top    Gold Top - SST    Light Blue Top       Medications Given in the Emergency Department:  Medications   sodium chloride 0.9 % flush 10 mL (has no administration in time range)   aspirin chewable tablet 324 mg (324 mg Oral Given 8/22/24 1829)   ketorolac (TORADOL) injection 30 mg (30 mg Intravenous Given 8/22/24 2017)   methylPREDNISolone sodium succinate (SOLU-Medrol) 125 mg in sterile water (preservative free) 2 mL (125 mg Intravenous Given 8/22/24 2018)   iopamidol (ISOVUE-370) 76 % injection 100 mL (100 mL Intravenous Given 8/22/24 2131)        ED Course:         Labs:    Lab Results (last 24 hours)       Procedure  Component Value Units Date/Time    High Sensitivity Troponin T [[id number]]  (Normal) Collected: 08/22/24 1740    Specimen: Blood from Arm, Right Updated: 08/22/24 1810     HS Troponin T <6 ng/L     Narrative:      High Sensitive Troponin T Reference Range:  <14.0 ng/L- Negative Female for AMI  <22.0 ng/L- Negative Male for AMI  >=14 - Abnormal Female indicating possible myocardial injury.  >=22 - Abnormal Male indicating possible myocardial injury.   Clinicians would have to utilize clinical acumen, EKG, Troponin, and serial changes to determine if it is an Acute Myocardial Infarction or myocardial injury due to an underlying chronic condition.         CBC & Differential [[id number]]  (Abnormal) Collected: 08/22/24 1740    Specimen: Blood from Arm, Right Updated: 08/22/24 1746    Narrative:      The following orders were created for panel order CBC & Differential.  Procedure                               Abnormality         Status                     ---------                               -----------         ------                     CBC Auto Differential[[id number]]        Abnormal            Final result                 Please view results for these tests on the individual orders.    Comprehensive Metabolic Panel [[id number]]  (Abnormal) Collected: 08/22/24 1740    Specimen: Blood from Arm, Right Updated: 08/22/24 1810     Glucose 120 mg/dL      BUN 9 mg/dL      Creatinine 1.10 mg/dL      Sodium 138 mmol/L      Potassium 4.1 mmol/L      Chloride 101 mmol/L      CO2 27.5 mmol/L      Calcium 9.6 mg/dL      Total Protein 7.5 g/dL      Albumin 4.5 g/dL      ALT (SGPT) 22 U/L      AST (SGOT) 21 U/L      Alkaline Phosphatase 92 U/L      Total Bilirubin 0.6 mg/dL      Globulin 3.0 gm/dL      A/G Ratio 1.5 g/dL      BUN/Creatinine Ratio 8.2     Anion Gap 9.5 mmol/L      eGFR 92.6 mL/min/1.73     Narrative:      GFR Normal >60  Chronic Kidney Disease <60  Kidney Failure <15      Lipase [[id number]]  (Normal) Collected:  08/22/24 1740    Specimen: Blood from Arm, Right Updated: 08/22/24 1810     Lipase 18 U/L     BNP [[id number]]  (Normal) Collected: 08/22/24 1740    Specimen: Blood from Arm, Right Updated: 08/22/24 1804     proBNP <36.0 pg/mL     Narrative:      This assay is used as an aid in the diagnosis of individuals suspected of having heart failure. It can be used as an aid in the diagnosis of acute decompensated heart failure (ADHF) in patients presenting with signs and symptoms of ADHF to the emergency department (ED). In addition, NT-proBNP of <300 pg/mL indicates ADHF is not likely.    Age Range Result Interpretation  NT-proBNP Concentration (pg/mL:      <50             Positive            >450                   Gray                 300-450                    Negative             <300    50-75           Positive            >900                  Gray                300-900                  Negative            <300      >75             Positive            >1800                  Gray                300-1800                  Negative            <300    Magnesium [[id number]]  (Normal) Collected: 08/22/24 1740    Specimen: Blood from Arm, Right Updated: 08/22/24 1810     Magnesium 1.9 mg/dL     CBC Auto Differential [[id number]]  (Abnormal) Collected: 08/22/24 1740    Specimen: Blood from Arm, Right Updated: 08/22/24 1746     WBC 9.73 10*3/mm3      RBC 4.80 10*6/mm3      Hemoglobin 14.6 g/dL      Hematocrit 43.1 %      MCV 89.8 fL      MCH 30.4 pg      MCHC 33.9 g/dL      RDW 12.3 %      RDW-SD 40.3 fl      MPV 9.5 fL      Platelets 305 10*3/mm3      Neutrophil % 79.4 %      Lymphocyte % 12.3 %      Monocyte % 6.7 %      Eosinophil % 0.7 %      Basophil % 0.6 %      Immature Grans % 0.3 %      Neutrophils, Absolute 7.72 10*3/mm3      Lymphocytes, Absolute 1.20 10*3/mm3      Monocytes, Absolute 0.65 10*3/mm3      Eosinophils, Absolute 0.07 10*3/mm3      Basophils, Absolute 0.06 10*3/mm3      Immature Grans, Absolute 0.03 10*3/mm3       nRBC 0.0 /100 WBC     High Sensitivity Troponin T 2Hr [[id number]] Collected: 08/22/24 2016    Specimen: Blood from Arm, Left Updated: 08/22/24 2046     HS Troponin T <6 ng/L      Troponin T Delta --     Comment: Unable to calculate.       Narrative:      High Sensitive Troponin T Reference Range:  <14.0 ng/L- Negative Female for AMI  <22.0 ng/L- Negative Male for AMI  >=14 - Abnormal Female indicating possible myocardial injury.  >=22 - Abnormal Male indicating possible myocardial injury.   Clinicians would have to utilize clinical acumen, EKG, Troponin, and serial changes to determine if it is an Acute Myocardial Infarction or myocardial injury due to an underlying chronic condition.                  Imaging:    CT Chest With Contrast Diagnostic    Result Date: 8/22/2024  CT CHEST W CONTRAST DIAGNOSTIC Date of Exam: 8/22/2024 9:08 PM EDT Indication: pleuritic chest pain, shortness of breath Comparison: AP chest x-ray 8/22/2024. No previous CT chest for comparison. Technique: Axial CT images were obtained of the chest after the uneventful intravenous administration of iodinated contrast.  Reconstructed coronal and sagittal images were also obtained. Automated exposure control and iterative construction methods were  used. Findings: No pulmonary Sinan filling defects are seen to suggest pulmonary emboli. Main pulmonary artery is nondilated. Heart size is normal. No pericardial or pleural effusion. Mild dependent subpleural groundglass opacities in the lower lobes are likely due to  atelectasis. Lungs are otherwise clear. No pneumothorax. No lymphadenopathy seen in the chest. Partially included solid organs of the upper abdomen appear unremarkable for the phase of contrast enhancement. No acute osseous abnormality is identified.     Impression: No evidence of pulmonary emboli or other acute abnormality in the chest. Electronically Signed: Ana Knapp  8/22/2024 9:41 PM EDT  Workstation ID: [id number]    XR  Chest 1 View    Result Date: 8/22/2024  XR CHEST 1 VW Date of Exam: 8/22/2024 5:47 PM EDT Indication: Chest Pain Triage Protocol Comparison: 10/25/2023 Findings: No focal consolidation. No pneumothorax or pleural effusion. Cardiac size is normal. The visualized clavicles appear intact. No displaced rib fractures. The visualized upper abdomen is normal.     Impression: No acute cardiopulmonary disease. Electronically Signed: Shailesh Mckeon MD  8/22/2024 6:05 PM EDT  Workstation ID: [id number]       Differential Diagnosis and Discussion:    Chest Pain:  Based on the patient's signs and symptoms, I considered aortic dissection, myocardial infaction, pulmonary embolism, cardiac tamponade, pericarditis, pneumothorax, musculoskeletal chest pain and other differential diagnosis as an etiology of the patient's chest pain.     All labs were reviewed and interpreted by me.  All X-rays impressions were independently interpreted by me.  EKG was interpreted by me.  CT scan radiology impression was interpreted by me.    MDM  Number of Diagnoses or Management Options  Pleurisy  Diagnosis management comments: In summary this is a 29-year-old male patient who presents to the emergency department for evaluation of chest tightness and pleuritic type chest pain.  High-sensitivity troponin x 2 independent viewed interpreted by me is negative for acute pathology.  Chest x-ray reviewed by me is negative for acute pathology.  CBC independently reviewed and interpreted by me and shows no critical abnormalities.  CMP independently reviewed and interpreted by me and shows no critical abnormalities.  CT scan of the chest reviewed by me is negative for pulmonary embolism.  Patient was given Toradol and Solu-Medrol emergency department with improvement of symptoms as well.  He will be discharged home for treatment with pleurisy including steroids and ibuprofen.  Is also been given Pepcid prescription to prevent steroid and anti-inflammatory induced  ulcer disease.  Very strict return to ER and follow-up instructions have been provided to the patient.                   Patient Care Considerations:    ANTIBIOTICS: I considered prescribing antibiotics as an outpatient however no bacterial focus of infection was found.      Consultants/Shared Management Plan:    None    Social Determinants of Health:    Patient is independent, reliable, and has access to care.       Disposition and Care Coordination:    Discharged: I considered escalation of care by admitting this patient to the hospital, however negative cardiac and PE workup    I have explained the patient´s condition, diagnoses and treatment plan based on the information available to me at this time. I have answered questions and addressed any concerns. The patient has a good  understanding of the patient´s diagnosis, condition, and treatment plan as can be expected at this point. The vital signs have been stable. The patient´s condition is stable and appropriate for discharge from the emergency department.      The patient will pursue further outpatient evaluation with the primary care physician or other designated or consulting physician as outlined in the discharge instructions. They are agreeable to this plan of care and follow-up instructions have been explained in detail. The patient has received these instructions in written format and has expressed an understanding of the discharge instructions. The patient is aware that any significant change in condition or worsening of symptoms should prompt an immediate return to this or the closest emergency department or call to 911.  I have explained discharge medications and the need for follow up with the patient/caretakers. This was also printed in the discharge instructions. Patient was discharged with the following medications and follow up:      Medication List        New Prescriptions      famotidine 20 MG tablet  Commonly known as: PEPCID  Take 1 tablet by  mouth 2 (Two) Times a Day.     ibuprofen 800 MG tablet  Commonly known as: ADVIL,MOTRIN  Take 1 tablet by mouth Every 8 (Eight) Hours As Needed for Mild Pain.     methylPREDNISolone 4 MG dose pack  Commonly known as: MEDROL  Take 6 tablets by mouth Daily for 1 day, THEN 5 tablets Daily for 1 day, THEN 4 tablets Daily for 1 day, THEN 3 tablets Daily for 1 day, THEN 2 tablets Daily for 1 day, THEN 1 tablet Daily for 1 day. Take as directed on package instructions.  Start taking on: August 22, 2024               Where to Get Your Medications        These medications were sent to [facility]/pharmacy #[id number] - [place], [place] - [address] - [phone] [facility] [phone] FX  [address] [place] [address]      Hours: 24-hours Phone: [phone]   famotidine 20 MG tablet  ibuprofen 800 MG tablet  methylPREDNISolone 4 MG dose pack      Provider, No Known  [facility]  [place] [address]    In 1 week         Final diagnoses:   Pleurisy        ED Disposition       ED Disposition   Discharge    Condition   Stable    Comment   --               This medical record created using voice recognition software.             Abioudn Jarquin MD  08/22/24 1226

## 2024-08-23 NOTE — DISCHARGE INSTRUCTIONS
Prescriptions for steroids and anti-inflammatories have been sent to the pharmacy.  He also have a prescription for Pepcid which will help prevent stomach ulcers while you are taking steroids and anti-inflammatories together.

## 2024-08-30 LAB
QT INTERVAL: 373 MS
QTC INTERVAL: 411 MS